# Patient Record
Sex: MALE | Race: WHITE | URBAN - METROPOLITAN AREA
[De-identification: names, ages, dates, MRNs, and addresses within clinical notes are randomized per-mention and may not be internally consistent; named-entity substitution may affect disease eponyms.]

---

## 2017-11-24 ENCOUNTER — APPOINTMENT (OUTPATIENT)
Dept: CARDIOLOGY | Facility: CLINIC | Age: 67
End: 2017-11-24
Attending: HOSPITALIST
Payer: COMMERCIAL

## 2017-11-24 ENCOUNTER — APPOINTMENT (OUTPATIENT)
Dept: GENERAL RADIOLOGY | Facility: CLINIC | Age: 67
End: 2017-11-24
Attending: EMERGENCY MEDICINE
Payer: COMMERCIAL

## 2017-11-24 ENCOUNTER — APPOINTMENT (OUTPATIENT)
Dept: CT IMAGING | Facility: CLINIC | Age: 67
End: 2017-11-24
Attending: EMERGENCY MEDICINE
Payer: COMMERCIAL

## 2017-11-24 ENCOUNTER — HOSPITAL ENCOUNTER (OUTPATIENT)
Facility: CLINIC | Age: 67
Setting detail: OBSERVATION
Discharge: HOME | End: 2017-11-25
Attending: EMERGENCY MEDICINE | Admitting: HOSPITALIST
Payer: COMMERCIAL

## 2017-11-24 DIAGNOSIS — R55 NEAR SYNCOPE: ICD-10-CM

## 2017-11-24 DIAGNOSIS — R07.9 CHEST PAIN, UNSPECIFIED TYPE: ICD-10-CM

## 2017-11-24 LAB
ANION GAP SERPL CALCULATED.3IONS-SCNC: 8 MMOL/L (ref 3–14)
ANION GAP SERPL CALCULATED.3IONS-SCNC: NORMAL MMOL/L (ref 6–17)
BASOPHILS # BLD AUTO: 0 10E9/L (ref 0–0.2)
BASOPHILS NFR BLD AUTO: 0.3 %
BUN SERPL-MCNC: 24 MG/DL (ref 7–30)
BUN SERPL-MCNC: NORMAL MG/DL (ref 7–30)
CALCIUM SERPL-MCNC: 8.4 MG/DL (ref 8.5–10.1)
CALCIUM SERPL-MCNC: NORMAL MG/DL (ref 8.5–10.1)
CHLORIDE SERPL-SCNC: 111 MMOL/L (ref 94–109)
CHLORIDE SERPL-SCNC: NORMAL MMOL/L (ref 94–109)
CO2 SERPL-SCNC: 24 MMOL/L (ref 20–32)
CO2 SERPL-SCNC: NORMAL MMOL/L (ref 20–32)
CREAT SERPL-MCNC: 1.34 MG/DL (ref 0.66–1.25)
CREAT SERPL-MCNC: NORMAL MG/DL (ref 0.66–1.25)
DIFFERENTIAL METHOD BLD: NORMAL
EOSINOPHIL # BLD AUTO: 0.2 10E9/L (ref 0–0.7)
EOSINOPHIL NFR BLD AUTO: 2.3 %
ERYTHROCYTE [DISTWIDTH] IN BLOOD BY AUTOMATED COUNT: 14 % (ref 10–15)
GFR SERPL CREATININE-BSD FRML MDRD: 53 ML/MIN/1.7M2
GFR SERPL CREATININE-BSD FRML MDRD: NORMAL ML/MIN/1.7M2
GLUCOSE SERPL-MCNC: 128 MG/DL (ref 70–99)
GLUCOSE SERPL-MCNC: NORMAL MG/DL (ref 70–99)
HCT VFR BLD AUTO: 49.6 % (ref 40–53)
HGB BLD-MCNC: 16.7 G/DL (ref 13.3–17.7)
IMM GRANULOCYTES # BLD: 0 10E9/L (ref 0–0.4)
IMM GRANULOCYTES NFR BLD: 0.3 %
LYMPHOCYTES # BLD AUTO: 3.5 10E9/L (ref 0.8–5.3)
LYMPHOCYTES NFR BLD AUTO: 37.3 %
MCH RBC QN AUTO: 29.5 PG (ref 26.5–33)
MCHC RBC AUTO-ENTMCNC: 33.7 G/DL (ref 31.5–36.5)
MCV RBC AUTO: 88 FL (ref 78–100)
MONOCYTES # BLD AUTO: 0.8 10E9/L (ref 0–1.3)
MONOCYTES NFR BLD AUTO: 8.1 %
NEUTROPHILS # BLD AUTO: 4.9 10E9/L (ref 1.6–8.3)
NEUTROPHILS NFR BLD AUTO: 51.7 %
NRBC # BLD AUTO: 0 10*3/UL
NRBC BLD AUTO-RTO: 0 /100
PLATELET # BLD AUTO: 208 10E9/L (ref 150–450)
POTASSIUM SERPL-SCNC: 4.3 MMOL/L (ref 3.4–5.3)
POTASSIUM SERPL-SCNC: NORMAL MMOL/L (ref 3.4–5.3)
RBC # BLD AUTO: 5.67 10E12/L (ref 4.4–5.9)
SODIUM SERPL-SCNC: 143 MMOL/L (ref 133–144)
SODIUM SERPL-SCNC: NORMAL MMOL/L (ref 133–144)
TROPONIN I BLD-MCNC: 0.01 UG/L (ref 0–0.1)
TROPONIN I SERPL-MCNC: <0.015 UG/L (ref 0–0.04)
TROPONIN I SERPL-MCNC: NORMAL UG/L (ref 0–0.04)
WBC # BLD AUTO: 9.4 10E9/L (ref 4–11)

## 2017-11-24 PROCEDURE — 25000132 ZZH RX MED GY IP 250 OP 250 PS 637: Mod: GY | Performed by: HOSPITALIST

## 2017-11-24 PROCEDURE — 93306 TTE W/DOPPLER COMPLETE: CPT | Mod: 26 | Performed by: INTERNAL MEDICINE

## 2017-11-24 PROCEDURE — 99285 EMERGENCY DEPT VISIT HI MDM: CPT | Mod: 25

## 2017-11-24 PROCEDURE — 25000128 H RX IP 250 OP 636: Performed by: EMERGENCY MEDICINE

## 2017-11-24 PROCEDURE — 94660 CPAP INITIATION&MGMT: CPT

## 2017-11-24 PROCEDURE — 84484 ASSAY OF TROPONIN QUANT: CPT | Mod: 91 | Performed by: EMERGENCY MEDICINE

## 2017-11-24 PROCEDURE — 40000275 ZZH STATISTIC RCP TIME EA 10 MIN

## 2017-11-24 PROCEDURE — 25000132 ZZH RX MED GY IP 250 OP 250 PS 637: Performed by: EMERGENCY MEDICINE

## 2017-11-24 PROCEDURE — 25000128 H RX IP 250 OP 636: Performed by: HOSPITALIST

## 2017-11-24 PROCEDURE — 96374 THER/PROPH/DIAG INJ IV PUSH: CPT

## 2017-11-24 PROCEDURE — 84484 ASSAY OF TROPONIN QUANT: CPT

## 2017-11-24 PROCEDURE — 84484 ASSAY OF TROPONIN QUANT: CPT | Performed by: HOSPITALIST

## 2017-11-24 PROCEDURE — 96375 TX/PRO/DX INJ NEW DRUG ADDON: CPT

## 2017-11-24 PROCEDURE — 85025 COMPLETE CBC W/AUTO DIFF WBC: CPT | Performed by: EMERGENCY MEDICINE

## 2017-11-24 PROCEDURE — 36415 COLL VENOUS BLD VENIPUNCTURE: CPT | Performed by: HOSPITALIST

## 2017-11-24 PROCEDURE — 93005 ELECTROCARDIOGRAM TRACING: CPT

## 2017-11-24 PROCEDURE — G0378 HOSPITAL OBSERVATION PER HR: HCPCS

## 2017-11-24 PROCEDURE — A9270 NON-COVERED ITEM OR SERVICE: HCPCS | Mod: GY | Performed by: HOSPITALIST

## 2017-11-24 PROCEDURE — 96361 HYDRATE IV INFUSION ADD-ON: CPT

## 2017-11-24 PROCEDURE — 71020 XR CHEST 2 VW: CPT

## 2017-11-24 PROCEDURE — 80048 BASIC METABOLIC PNL TOTAL CA: CPT | Performed by: EMERGENCY MEDICINE

## 2017-11-24 PROCEDURE — 70450 CT HEAD/BRAIN W/O DYE: CPT

## 2017-11-24 PROCEDURE — 99220 ZZC INITIAL OBSERVATION CARE,LEVL III: CPT | Performed by: HOSPITALIST

## 2017-11-24 PROCEDURE — 93306 TTE W/DOPPLER COMPLETE: CPT

## 2017-11-24 RX ORDER — ONDANSETRON 2 MG/ML
4 INJECTION INTRAMUSCULAR; INTRAVENOUS ONCE
Status: COMPLETED | OUTPATIENT
Start: 2017-11-24 | End: 2017-11-24

## 2017-11-24 RX ORDER — ACETAMINOPHEN 325 MG/1
650 TABLET ORAL EVERY 4 HOURS PRN
Status: DISCONTINUED | OUTPATIENT
Start: 2017-11-24 | End: 2017-11-25 | Stop reason: HOSPADM

## 2017-11-24 RX ORDER — LIDOCAINE 40 MG/G
CREAM TOPICAL
Status: DISCONTINUED | OUTPATIENT
Start: 2017-11-24 | End: 2017-11-25 | Stop reason: HOSPADM

## 2017-11-24 RX ORDER — NITROGLYCERIN 0.4 MG/1
TABLET SUBLINGUAL
Status: DISCONTINUED
Start: 2017-11-24 | End: 2017-11-24 | Stop reason: HOSPADM

## 2017-11-24 RX ORDER — ONDANSETRON 2 MG/ML
INJECTION INTRAMUSCULAR; INTRAVENOUS
Status: DISCONTINUED
Start: 2017-11-24 | End: 2017-11-24 | Stop reason: HOSPADM

## 2017-11-24 RX ORDER — NITROGLYCERIN 0.4 MG/1
0.4 TABLET SUBLINGUAL EVERY 5 MIN PRN
Status: DISCONTINUED | OUTPATIENT
Start: 2017-11-24 | End: 2017-11-25 | Stop reason: HOSPADM

## 2017-11-24 RX ORDER — ASPIRIN 81 MG/1
81 TABLET ORAL DAILY
Status: DISCONTINUED | OUTPATIENT
Start: 2017-11-25 | End: 2017-11-25 | Stop reason: HOSPADM

## 2017-11-24 RX ORDER — ACETAMINOPHEN 650 MG/1
650 SUPPOSITORY RECTAL EVERY 4 HOURS PRN
Status: DISCONTINUED | OUTPATIENT
Start: 2017-11-24 | End: 2017-11-25 | Stop reason: HOSPADM

## 2017-11-24 RX ORDER — SODIUM CHLORIDE 9 MG/ML
INJECTION, SOLUTION INTRAVENOUS CONTINUOUS
Status: DISCONTINUED | OUTPATIENT
Start: 2017-11-24 | End: 2017-11-25 | Stop reason: HOSPADM

## 2017-11-24 RX ORDER — NALOXONE HYDROCHLORIDE 0.4 MG/ML
.1-.4 INJECTION, SOLUTION INTRAMUSCULAR; INTRAVENOUS; SUBCUTANEOUS
Status: DISCONTINUED | OUTPATIENT
Start: 2017-11-24 | End: 2017-11-25 | Stop reason: HOSPADM

## 2017-11-24 RX ORDER — ONDANSETRON 2 MG/ML
4 INJECTION INTRAMUSCULAR; INTRAVENOUS ONCE
Status: DISCONTINUED | OUTPATIENT
Start: 2017-11-24 | End: 2017-11-24

## 2017-11-24 RX ORDER — NITROGLYCERIN 0.4 MG/1
0.4 TABLET SUBLINGUAL EVERY 5 MIN PRN
Status: DISCONTINUED | OUTPATIENT
Start: 2017-11-24 | End: 2017-11-24

## 2017-11-24 RX ORDER — ALUMINA, MAGNESIA, AND SIMETHICONE 2400; 2400; 240 MG/30ML; MG/30ML; MG/30ML
30 SUSPENSION ORAL EVERY 4 HOURS PRN
Status: DISCONTINUED | OUTPATIENT
Start: 2017-11-24 | End: 2017-11-25 | Stop reason: HOSPADM

## 2017-11-24 RX ORDER — ASPIRIN 81 MG/1
162 TABLET, CHEWABLE ORAL ONCE
Status: COMPLETED | OUTPATIENT
Start: 2017-11-24 | End: 2017-11-24

## 2017-11-24 RX ADMIN — ONDANSETRON 4 MG: 2 SOLUTION INTRAMUSCULAR; INTRAVENOUS at 11:59

## 2017-11-24 RX ADMIN — ASPIRIN 81 MG 162 MG: 81 TABLET ORAL at 18:35

## 2017-11-24 RX ADMIN — PROCHLORPERAZINE EDISYLATE 5 MG: 5 INJECTION INTRAMUSCULAR; INTRAVENOUS at 12:58

## 2017-11-24 RX ADMIN — SODIUM CHLORIDE: 9 INJECTION, SOLUTION INTRAVENOUS at 18:35

## 2017-11-24 RX ADMIN — SODIUM CHLORIDE 1000 ML: 9 INJECTION, SOLUTION INTRAVENOUS at 11:59

## 2017-11-24 RX ADMIN — ACETAMINOPHEN 650 MG: 325 TABLET, FILM COATED ORAL at 18:35

## 2017-11-24 RX ADMIN — NITROGLYCERIN 0.4 MG: 0.4 TABLET SUBLINGUAL at 12:23

## 2017-11-24 ASSESSMENT — ENCOUNTER SYMPTOMS
DIAPHORESIS: 1
DIZZINESS: 1
HEADACHES: 1
LIGHT-HEADEDNESS: 1
SHORTNESS OF BREATH: 0
BACK PAIN: 0
DIARRHEA: 0
ABDOMINAL PAIN: 0

## 2017-11-24 NOTE — PROGRESS NOTES
Paged Dr Valles to clarify heart test orders.   Please refer to his admission note regarding treatment plans.

## 2017-11-24 NOTE — ED PROVIDER NOTES
History     Chief Complaint:  Near syncope      HPI   Williasm Ma is a 67 year old male who presents to the emergency department today for evaluation of near syncope. 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 He had a near syncopal episode with no loss of consciousness, followed by emesis. He denies chest pain, shortness of breath, abdominal pain, diarrhea, or back pain. Due to concern for symptoms he presents to the emergency department for evaluation. On presentation, he complains of some headache. He denies use of beta blockers or blood thinners.  Per the patient, he was previously on tamsulosin but is no longer on it. He denies history of diabetes or cardiac pathology.    When EMS arrived, he had a pulse of around 40, but this quickly improved en-route.  In the ED, he complains of chest tightness and mild headache.  He feels hot.    Allergies:  Penicillins  Tetracyclines    Medications:    The patient is currently on no regular medications.    Past Medical History:    History reviewed. No pertinent past medical history.    Past Surgical History:    Prostate and bladder surgery 01/17  Hernia repair  Orthopedic surgery    Family History:    History reviewed. No pertinent family history.     Social History:  The patient was unaccompanied to the ED.  Smoking Status: Former  Alcohol Use: No     Review of Systems   Constitutional: Positive for diaphoresis.   HENT: Positive for tinnitus.    Eyes: Positive for visual disturbance.   Respiratory: Negative for shortness of breath.    Cardiovascular: Negative for chest pain.   Gastrointestinal: Negative for abdominal pain and diarrhea.   Musculoskeletal: Negative for back pain.   Neurological: Positive for dizziness, light-headedness and headaches. Negative for syncope.   All other systems  "reviewed and are negative.    Physical Exam     Patient Vitals for the past 24 hrs:   BP Temp Temp src Pulse Heart Rate Resp SpO2 Height Weight   11/24/17 1330 120/88 - - - 56 8 94 % - -   11/24/17 1300 109/58 - - - 59 11 - - -   11/24/17 1245 118/83 - - - 55 (!) 7 95 % - -   11/24/17 1230 (!) 121/94 - - - 58 19 95 % - -   11/24/17 1215 133/86 - - - 58 10 97 % - -   11/24/17 1200 127/90 - - - 50 8 97 % - -   11/24/17 1155 - - - - 55 12 94 % - -   11/24/17 1154 (!) 156/106 97.6  F (36.4  C) Oral 60 - 16 (!) 89 % 1.778 m (5' 10\") 129.3 kg (285 lb)   11/24/17 1150 - - - - 49 (!) 7 96 % - -   11/24/17 1148 138/87 - - - 66 - 96 % - -   11/24/17 1145 - - - - 54 - 90 % - -       Physical Exam   General: Resting comfortably on the gurney, appears mildly ill, diaphoretic. Heart rate 60 on   arrival.  Head:  The scalp, face, and head appear normal  Eyes:  The pupils are equal, round, and reactive to light    There is no nystagmus    Extraocular muscles are intact    Conjunctivae and sclerae are normal  ENT:    The nose is normal    Pinnae are normal    The oropharynx is normal    Uvula is in the midline  Neck:  Normal range of motion    There is no rigidity noted    There is no midline cervical spine pain/tenderness    Trachea is in the midline    No mass is detected  CV:  Regular rate and underlying rhythm, in the 60's    Normal S1/S2, no S3/S4    No pathological murmur detected  Resp:  Lungs are clear    There is no tachypnea    Non-labored    No rales    No wheezing   GI:  Abdomen is soft, there is no rigidity    There is a umbilical hernia scar from surgical repair.    No distension    No tympani    No rebound tenderness     Non-surgical without peritoneal features  MS:  Normal muscular tone    Symmetric motor strength    No major joint effusions    No asymmetric leg swelling, no calf tenderness  Skin:  No rash or acute skin lesions noted  Neuro: Speech is normal and fluent  Psych:  Awake. Alert.      Normal affect.  " Appropriate interactions.  Lymph: No anterior cervical lymphadenopathy noted    Emergency Department Course     ECG:  Indication: near syncope  Completed at 1140.  Read at 1143.   Normal sinus rhythm  Left axis deviation  Right bundle branch block - old per patient  Abnormal ECG   Rate 61 bpm. MO interval 172. QRS duration 144. QT/QTc 438/440. P-R-T axes 61 -48 -11.    Completed at 1221.  Read at 1223.   Sinus bradycardia  Left axis deviation  Right bundle branch block  Abnormal ECG   Rate 51 bpm. MO interval 176. QRS duration 148. QT/QTc 472/435. P-R-T axes 63 -44 -22.    Imaging:  Radiology findings were communicated with the patient who voiced understanding of the findings.  XR Chest 2 Views  IMPRESSION: No evidence of pneumonia.  Report per radiology     Head CT w/o contrast  IMPRESSION: Diffuse cerebral volume loss and cerebral white matter changes consistent with chronic small vessel ischemic disease. No evidence of acute intracranial pathology.  Report per radiology     Laboratory:  Laboratory findings were communicated with the patient who voiced understanding of the findings.  Troponin (Collected 1235): 0.01  BMP: Chloride 111 (H), Glucose 128 (H), Creatinine 1.34 (H), GFR Estimate 53 (L), Calcium 8.4 (L), o/w WNL.  Troponin (Collected 1225): <0.015  CBC: AWNL. (WBC 9.4, HGB 16.7, )    Interventions:  1159 NS Bolus 1,000mL IV  1159 Zofran 4mg IV  1223 Nitrostat 0.4 mg Sublingual  1258 Compazine 5 mg IV    Emergency Department Course:  Nursing notes and vitals reviewed.  The patient was sent for a XR Chest 2 Views and Head CT w/o contrast while in the emergency department, results above.   IV was inserted and blood was drawn for laboratory testing, results above.    1147: I performed an exam of the patient as documented above.     1215: Nursing reports patient complains of new chest tightness and heaviness, therefore plan for nitro and repeat ECG.    1248: Patient rechecked and updated. Headache is  3/10 in severity. On trial of nitroglycerin, chest tightness did not resolve. Patient had repeated emesis at 1253.    1337: Patient rechecked and updated.     1338: I spoke with Dr. Valles of the hospitalist service regarding patient's presentation, findings, and plan of care.    Findings and plan explained to the Patient who consents to admission. Discussed the patient with Dr. Valles, who will admit the patient to a obs bed for further monitoring, evaluation, and treatment.  I personally reviewed the laboratory and imaging results with the Patient and answered all related questions prior to admission.    Impression & Plan         Medical Decision Making:  This patient presents with a near syncopal spell. He was noted to be bradycardic in the field but this resolved in short order. The bradycardia was noted right after a large amount of emesis. The patient developed a low-grade right-sided headache after being in the emergency department. He also developed low-grade chest heaviness and tightness. The chest discomfort did not improve with nitroglycerin.  The patient underwent CT scan of the brain which was negative for bleed. He had a chest x-ray performed given hypoxia when falling asleep and this was nonacute. PE is in the differential diagnosis although less likely. He does have obesity as a risk factor. The patient's oxygen saturations are low to mid 90s well he is awake, when he falls asleep they can fall to 80%. This sleep induced hypoxia may require further evaluation. He will be placed on observation at this time for formal rule out for myocardial infarction.    Critical Care time:  was 35 minutes for this patient excluding procedures.    Diagnosis:    ICD-10-CM    1. Near syncope R55    2. Chest pain, unspecified type R07.9    3.  Headache  4.  Bradycardia, resolved  5.  Possible Vasovagal Near Syncope    Disposition:  Admitted to OBS for monitoring and rule out (cardiac ischemia).    Scribe  Disclosure:  I, Racheletereso Jang, am serving as a scribe at 11:54 AM on 11/24/2017 to document services personally performed by Gonzalez Moody MD based on my observations and the provider's statements to me.    11/24/2017    EMERGENCY DEPARTMENT       Gonzalez Moody MD  11/24/17 8340

## 2017-11-24 NOTE — ED NOTES
Bed: ST02  Expected date:   Expected time:   Means of arrival:   Comments:  Jess - Ashli bradycardic eta 1133

## 2017-11-24 NOTE — PHARMACY-ADMISSION MEDICATION HISTORY
Admission Medication History    Admission medication history interview status for the 11/24/2017 admission is complete. See EPIC admission navigator for prior to admission medications     Medication history source reliability:Good    OTC supplements but no prescribed medications      Time spent in this activity: 10 minutes    Prior to Admission medications    Medication Sig Last Dose Taking? Auth Provider   VITAMIN D, CHOLECALCIFEROL, PO Take 5,000 Units by mouth daily Past Week at Unknown time Yes Unknown, Entered By History   Omega-3 Fatty Acids (FISH OIL PO) Take 1 capsule by mouth daily Past Week at Unknown time Yes Unknown, Entered By History       Milton Cerrato, PharmD

## 2017-11-24 NOTE — PROGRESS NOTES
The following criteria to be met before discharge:    - Serial troponins and stress test complete - not met  - Seen and cleared by consultant if applicable - not met  - Adequate pain control on oral analgesia - met  - Vital signs normal or at patient baseline - met    November 24, 2017

## 2017-11-24 NOTE — IP AVS SNAPSHOT
[facility] Observation Unit    [address] [place] [id number]    Phone:  [phone]                                       After Visit Summary   11/24/2017    Williams Ma    MRN: [id number]           After Visit Summary Signature Page     I have received my discharge instructions, and my questions have been answered. I have discussed any challenges I see with this plan with the nurse or doctor.    ..........................................................................................................................................  Patient/Patient Representative Signature      ..........................................................................................................................................  Patient Representative Print Name and Relationship to Patient    ..................................................               ................................................  Date                                            Time    ..........................................................................................................................................  Reviewed by Signature/Title    ...................................................              ..............................................  Date                                                            Time

## 2017-11-24 NOTE — H&P
PRIMARY CARE PHYSICIAN:  None listed.      CHIEF COMPLAINT:  Near-syncope.      HISTORY OF PRESENT ILLNESS:  Mr. Williams Ma is a pleasant 67-year-old male with no significant past medical history who was brought to the ER today by EMS for presyncope.  He was the [place] at [facility] with a child in his lap when he all of a sudden felt lightheaded, diaphoretic, some blurry vision and started throwing up.  EMS was called and noted his heart rate to be in the 40s.  He reports feeling very tired, fatigued.  He does report some chest tightness and headache.  Denies any fever, chills or rigors.  Denies pain in abdomen.  Reports normal bowel and bladder habit.  Here, in the ER, he was seen by Dr. Moody.  His heart rate was noted to be in the 60s, but he was noted to desaturate when he would fall asleep to oxygen saturation in the 80s.  Hospitalist requested admission for further evaluation.      REVIEW OF SYSTEMS:  A 10-point review of systems was done and were negative apart from those mentioned in the history of present illness.  Of note, sitting him up from a lying position would worsen his lightheadedness.      PAST MEDICAL HISTORY:  None.      MEDICATIONS PRIOR TO ADMISSION:   1.  Vitamin D.   2.  Fish oil.      ALLERGIES:  Penicillin and tetracycline.      SOCIAL HISTORY:  He denies smoking, takes occasional alcohol, no illicit drug use.      FAMILY HISTORY:  Reviewed and not pertinent to current presentation.      PHYSICAL EXAMINATION:   GENERAL:  The patient is conscious, alert, oriented x3, looks very fatigued but in no apparent distress.   VITAL SIGNS:  Temperature 97.6, heart rate of 60, blood pressure 136/98 and was 109/58 On presentation.  Saturation 96% on room air.  Does desaturate to mid 80s when he falls asleep.   HEENT:  Pupils are equal and reactive to light and accommodation.  Extraocular movements are intact.  Oral mucosa is moist.   NECK:  Supple, no rigidity.   RESPIRATORY:  Lung sounds  bilaterally clear to auscultation, no wheezes or crepitation.   CARDIOVASCULAR:  Normal S1-S2, regular rate and rhythm, no murmur.   ABDOMEN:  Obese, soft, nontender, no guarding, rigidity or rebound tenderness.   LOWER EXTREMITIES:  With no edema.   NEUROLOGIC:  No focal neurological deficits noted.  Cranial nerves II-XII grossly intact.   PSYCHIATRIC:  Normal mood and affect.   MUSCULOSKELETAL:  Power 5/5 in all extremities.      LABORATORY DATA AND IMAGING:  Reviewed in Epic, mostly unremarkable except for creatinine of 1.34.  Troponin I is negative.  Glucose is 128, hemoglobin 16.7.  Chest x-ray reviewed by me shows no acute infiltrates, effusion or pneumothorax.  CT head reviewed by me shows no evidence for acute intracranial pathology.  EKG reviewed by me showed some ST depression in lead 1 and aVL and also RBBB, which the patient states is old.  I have no prior EKGs to compare with.      ASSESSMENT AND PLAN:  Mr. Williams Ma is a 67-year-old relatively healthy male who was brought to the ER today by EMS for presyncope.     1.  Presyncope, likely vasovagal.  We will admit him for observation and monitor him on telemetry.  Will check orthostatic vitals, start him on IV hydration.  Will get an echocardiogram as well.     2.  Chest tightness.  No prior history of coronary artery disease.  EKG shows some ST depression in lead 1 and aVL, but no prior EKGs to compare with.  He denies any chest pain at this time.  We will monitor him on telemetry.  Will follow serial troponins.  Will order for a dobutamine stress echocardiogram in the a.m., but being the weekend we might not be able to obtain a stress test.  If he rules out, we can probably schedule a stress test as an outpatient.     3.  Renal dysfunction, likely acute or acute on chronic.  No prior labs to compare with.  He was vasovagal and hypotensive upon presentation.  Will repeat BMP with IV hydration.     4.  Deep vein thrombosis prophylaxis.  Encourage  ambulation.  Anticipate a short stay.     5.  Likely obstructive sleep apnea.  He was noted to desaturate to mid 80s when he falls back to sleep, probably has sleep apnea given his obesity.  He should get a sleep study as an outpatient.  We can try CPAP while he is in the hospital.      CODE STATUS:  Full code.         MARY ORNELAS MD             D: 2017 14:42   T: 2017 15:23   MT: SK      Name:     AYLA JUSTICE   MRN:      [id number]        Account:      [id number]   :      1950           Admitted:     [id number]      Document: [id number]

## 2017-11-24 NOTE — IP AVS SNAPSHOT
MRN:[id number]                      After Visit Summary   11/24/2017    Williams Ma    MRN: [id number]           Thank you!     Thank you for choosing [facility] for your care. Our goal is always to provide you with excellent care. Hearing back from our patients is one way we can continue to improve our services. Please take a few minutes to complete the written survey that you may receive in the mail after you visit with us. Thank you!        Patient Information     Date Of Birth          1950        About your hospital stay     You were admitted on:  November 24, 2017 You last received care in the:  [facility] Observation Unit    You were discharged on:  November 25, 2017        Reason for your hospital stay       Observation hospitalization for evaluation of chest pain                  Who to Call     For medical emergencies, please call 911.  For non-urgent questions about your medical care, please call your primary care provider or clinic, None          Attending Provider     Provider Gonzalez Bernard MD Emergency Medicine    Yung Valles MD Internal Medicine       Primary Care Provider    Physician No Ref-Primary      After Care Instructions     Activity       Your activity upon discharge: activity as tolerated            Diet       Follow this diet upon discharge: Regular                  Follow-up Appointments     Follow-up and recommended labs and tests        A referral has been made for you to have a Lexiscan stress test next week.  Follow up with primary care provider after you return to [place].                  Future tests that were ordered for you     NM Lexiscan stress test                 Pending Results     Date and Time Order Name Status Description    11/24/2017 1151 EKG 12 lead Preliminary             Statement of Approval     Ordered          11/25/17 1014  I have reviewed and agree with all the recommendations and orders detailed in this document.   "EFFECTIVE NOW     Approved and electronically signed by:  Thien Regalado PA             Admission Information     Date & Time Provider Department Dept. Phone    2017 Yung Valles MD [facility] Observation Unit [phone]      Your Vitals Were     Blood Pressure Pulse Temperature Respirations Height Weight    127/80 (BP Location: Right arm) 60 97.4  F (36.3  C) (Oral) 16 1.778 m (5' 10\") 130.3 kg (287 lb 4.8 oz)    Pulse Oximetry BMI (Body Mass Index)                92% 41.22 kg/m2          MyChart Information     [employer] lets you send messages to your doctor, view your test results, renew your prescriptions, schedule appointments and more. To sign up, go to [email or web address] . Click on \"Log in\" on the left side of the screen, which will take you to the Welcome page. Then click on \"Sign up Now\" on the right side of the page.     You will be asked to enter the access code listed below, as well as some personal information. Please follow the directions to create your username and password.     Your access code is: [email or web address]  Expires: 2018  5:28 PM     Your access code will  in 90 days. If you need help or a new code, please call your [place] clinic or [phone].        Care EveryWhere ID     This is your Care EveryWhere ID. This could be used by other organizations to access your [place] medical records  [id number]        Equal Access to Services     [facility] AH: Hadii derik ku hadasho Soomaali, waaxda luqadaha, qaybta kaalmada adeegyada, jose luis blake. So [facility] [phone].    ATENCIÓN: Si habla español, tiene a lopez disposición servicios gratuitos de asistencia lingüística. Llame al [phone].    We comply with applicable federal civil rights laws and [place] laws. We do not discriminate on the basis of race, color, national origin, age, disability, sex, sexual orientation, or gender identity.               Review of your medicines    "   START taking        Dose / Directions    aspirin 81 MG EC tablet   Used for:  Chest pain, unspecified type        Dose:  81 mg   Start taking on:  11/26/2017   Take 1 tablet (81 mg) by mouth daily   Refills:  0         CONTINUE these medicines which have NOT CHANGED        Dose / Directions    FISH OIL PO        Dose:  1 capsule   Take 1 capsule by mouth daily   Refills:  0       VITAMIN D (CHOLECALCIFEROL) PO        Dose:  5000 Units   Take 5,000 Units by mouth daily   Refills:  0            Where to get your medicines      Some of these will need a paper prescription and others can be bought over the counter. Ask your nurse if you have questions.     You don't need a prescription for these medications     aspirin 81 MG EC tablet                Protect others around you: Learn how to safely use, store and throw away your medicines at www.disposemymeds.org.             Medication List: This is a list of all your medications and when to take them. Check marks below indicate your daily home schedule. Keep this list as a reference.      Medications           Morning Afternoon Evening Bedtime As Needed    aspirin 81 MG EC tablet   Take 1 tablet (81 mg) by mouth daily   Start taking on:  11/26/2017                                FISH OIL PO   Take 1 capsule by mouth daily                                VITAMIN D (CHOLECALCIFEROL) PO   Take 5,000 Units by mouth daily

## 2017-11-24 NOTE — ED NOTES
[facility]  ED Nurse Handoff Report    ED Chief complaint: Loss of Consciousness      ED Diagnosis:   Final diagnoses:   None       Code Status: Full Code    Allergies:   Allergies   Allergen Reactions     Penicillins      Tetracyclines        Activity level - Baseline/Home:  Independent    Activity Level - Current:   Stand with Assist of 2     Needed?: No    Isolation: No  Infection: Not Applicable    Bariatric?: No    Vital Signs:   Vitals:    11/24/17 1230 11/24/17 1245 11/24/17 1300 11/24/17 1330   BP: (!) 121/94 118/83 109/58 120/88   Pulse:       Resp: 19 (!) 7 11 8   Temp:       TempSrc:       SpO2: 95% 95%  94%   Weight:       Height:           Cardiac Rhythm: ,        Pain level: 0-10 Pain Scale: 0    Is this patient confused?: No    Patient Report: Initial Complaint: near syncope  Focused Assessment: Williams Ma is a 67 year old male who presents to the emergency department today for evaluation of near syncope. 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 He had a near syncopal episode with no loss of consciousness, followed by emesis. He denies chest pain, shortness of breath, abdominal pain, diarrhea, or back pain. Due to concern for symptoms he presents to the emergency department for evaluation. On presentation, he complains of some headache. He denies use of beta blockers or blood thinners. Pt did complain later of CP and we tried nitro without relief and repeat EKG. PT O2 SATS low 90s on arrival but when pt would fall asleep he would desat into the 70s. Pt put on 6 lpm o2 but continued to desat into the 70s when sleeping.   Tests Performed: labs, CT,XR  Abnormal Results: SEE RESULTS  Treatments provided: fluids.    Family Comments: no family here pt lives in [place]    OBS brochure/video  discussed/provided to patient: yes    ED Medications:   Medications   ondansetron (ZOFRAN) injection 4 mg (not administered)   nitroGLYcerin (NITROSTAT) sublingual tablet 0.4 mg (0.4 mg Sublingual Given 11/24/17 1223)   nitroGLYcerin (NITROSTAT) 0.4 MG sublingual tablet (not administered)   0.9% sodium chloride BOLUS (1,000 mLs Intravenous New Bag 11/24/17 1159)   ondansetron (ZOFRAN) injection 4 mg (4 mg Intravenous Given 11/24/17 1159)   prochlorperazine (COMPAZINE) injection 5 mg (5 mg Intravenous Given 11/24/17 1258)       Drips infusing?:  No      ED NURSE PHONE NUMBER: [phone]

## 2017-11-25 VITALS
OXYGEN SATURATION: 92 % | TEMPERATURE: 97.4 F | DIASTOLIC BLOOD PRESSURE: 80 MMHG | WEIGHT: 287.3 LBS | BODY MASS INDEX: 41.13 KG/M2 | HEIGHT: 70 IN | HEART RATE: 60 BPM | SYSTOLIC BLOOD PRESSURE: 127 MMHG | RESPIRATION RATE: 16 BRPM

## 2017-11-25 LAB
ANION GAP SERPL CALCULATED.3IONS-SCNC: 7 MMOL/L (ref 3–14)
BUN SERPL-MCNC: 18 MG/DL (ref 7–30)
CALCIUM SERPL-MCNC: 8.2 MG/DL (ref 8.5–10.1)
CHLORIDE SERPL-SCNC: 108 MMOL/L (ref 94–109)
CO2 SERPL-SCNC: 27 MMOL/L (ref 20–32)
CREAT SERPL-MCNC: 1.16 MG/DL (ref 0.66–1.25)
GFR SERPL CREATININE-BSD FRML MDRD: 62 ML/MIN/1.7M2
GLUCOSE SERPL-MCNC: 85 MG/DL (ref 70–99)
POTASSIUM SERPL-SCNC: 4.1 MMOL/L (ref 3.4–5.3)
SODIUM SERPL-SCNC: 142 MMOL/L (ref 133–144)

## 2017-11-25 PROCEDURE — 36415 COLL VENOUS BLD VENIPUNCTURE: CPT | Performed by: HOSPITALIST

## 2017-11-25 PROCEDURE — 25000128 H RX IP 250 OP 636: Performed by: HOSPITALIST

## 2017-11-25 PROCEDURE — 96361 HYDRATE IV INFUSION ADD-ON: CPT

## 2017-11-25 PROCEDURE — 99217 ZZC OBSERVATION CARE DISCHARGE: CPT | Performed by: PHYSICIAN ASSISTANT

## 2017-11-25 PROCEDURE — 80048 BASIC METABOLIC PNL TOTAL CA: CPT | Performed by: HOSPITALIST

## 2017-11-25 PROCEDURE — G0378 HOSPITAL OBSERVATION PER HR: HCPCS

## 2017-11-25 RX ADMIN — SODIUM CHLORIDE: 9 INJECTION, SOLUTION INTRAVENOUS at 04:57

## 2017-11-25 NOTE — PROGRESS NOTES
The following criteria to be met before discharge:     - Serial troponins - met  -stress test complete - not met  - Seen and cleared by consultant if applicable - not met  - Adequate pain control on oral analgesia - met  - Vital signs normal or at patient baseline - met

## 2017-11-25 NOTE — PLAN OF CARE
Problem: Patient Care Overview  Goal: Plan of Care/Patient Progress Review  Outcome: Improving  Patient states he feels much batter than he did upon admission.  2 of 3 trops negative.  No nausea or vomiting this shift.  Head CT and CXR negative.  Tele=SR with right BBB.  Adenosine stress test ordered and unavailable tomorrow, MD aware.  No nausea or vomiting this shift.  PT is alert and oriented X4.  Up with SBA.

## 2017-11-25 NOTE — PROGRESS NOTES
Pt was on CPAP for few hrs and refused to wear the rest of the night. RT will continue to follow.  11/25/2017  Kobe Constantino

## 2017-11-25 NOTE — PROGRESS NOTES
The following criteria to be met before discharge:    - Serial troponins and stress test complete - not met  - Seen and cleared by consultant if applicable - not met  - Adequate pain control on oral analgesia - met  - Vital signs normal or at patient baseline - met

## 2017-11-25 NOTE — PROGRESS NOTES
The following criteria to be met before discharge:    - Serial troponins and stress test complete - not met  - Seen and cleared by consultant if applicable - not met  - Adequate pain control on oral analgesia - met  - Vital signs normal or at patient baseline - met    A&O x4. SBA to BR. Denies chest pain, n/v, or dizziness. VSS. Tolerating diet though poor appetite. Declined CPAP. Pt states he is feeling much better. Trops neg. Tele SR with BBB. Stress test tomorrow.

## 2017-11-25 NOTE — PLAN OF CARE
Problem: Patient Care Overview  Goal: Plan of Care/Patient Progress Review  Outcome: Adequate for Discharge Date Met: 11/25/17  Patient discharged to home by wheelchair at 11:47 AM 11/25/17.  Medication regimen  discussed with patient and patient verbalizes understanding.  Diet and activity discussed with patient.  Upcoming appointments reviewed.  No questions at this time.

## 2017-11-25 NOTE — PROGRESS NOTES
Pt's son called from [place] worried that pt might be having slurred speech after speaking on the phone with pt. Writer assessed pt, speech is clear. Neuro check normal.

## 2017-11-25 NOTE — PLAN OF CARE
Problem: Patient Care Overview  Goal: Plan of Care/Patient Progress Review  Outcome: Improving  A&O. VSS,RA. Denied chest pain/dizziness/lightheadedness/nausea this shift. Trop 3/3 negative. IV infusing. SBA for transfer. Plan fort dobutamine stress test but due to weekend which is not going to happen today. MD aware. Continue to monitor.

## 2017-11-26 LAB — INTERPRETATION ECG - MUSE: NORMAL

## 2017-11-26 NOTE — DISCHARGE SUMMARY
[facility]    Discharge Summary  Hospitalist    Date of Admission:  11/24/2017  Date of Discharge:  11/25/2017  Discharging Provider: Thien Regalado  Date of Service (when I saw the patient): 11/25/17    Discharge Diagnoses   Presyncope, likely vasovagal  Chest tightness  Acute kidney injury with possible chronic kidney disease  Suspected sleep apnea  Obesity    History of Present Illness   Mr. Williams Ma is a pleasant 67-year-old male with no significant past medical history who was brought to the ER 11/24 by EMS for presyncope.  He was the [place] at [facility] with a child in his lap when he all of a sudden felt lightheaded, diaphoretic, some blurry vision and started vomiting.  EMS was called and noted his heart rate to be in the 40s.  He reports feeling very tired, fatigued.  He does report some chest tightness and headache.  Denies any fever, chills or rigors.  Denies pain in abdomen.  Reports normal bowel and bladder habit.  Here, in the ER, he was seen by Dr. Moody.  His heart rate was noted to be in the 60s, but he was noted to desaturate when he would fall asleep to oxygen saturation in the 80s.  He was admitted to the hospitalist service observation care unit for further evaluation.    Hospital Course   Williams Ma was admitted on 11/24/2017.  The following problems were addressed during his hospitalization:  Mr. Williams Ma is a 67-year-old relatively healthy male who was brought to the ER by EMS for presyncope.      Presyncope, likely vasovagal.    - CT head negative for any acute intracranial pathology.  - Telemetry was monitored without any concerning arrhythmias.   - He was hydrated with IV fluids.  - Orthostatic vital signs were normal after hydration.     - Echocardiogram shows normal ejection fraction of 60%.  There are no regional wall motion abnormalities.  No significant valvular disease.  Ascending aorta is moderately dilated 4.6 cm.  - Close follow-up with  primary care provider.     Chest tightness.  No prior history of coronary artery disease.  EKG shows some ST depression in lead 1 and aVL, but no prior EKGs to compare with.  Chest x-ray negative for acute abnormalities.  He denies any chest pain during this admission.  - Telemetry without concerning findings.  - Serial troponin negative x3.   - Plan for medical stress test in the morning, however due to limitations in this hospital for doing medical stress tests on the weekend this was unable to be completed  - Discussed with patient and he will follow-up next week for a Lexiscan nuclear stress test.  - He was instructed to continue with aspirin 81 mg daily until follow-up.     Acute kidney injury, with possible chronic kidney disease.  Creatinine 1.34 on admission.  No prior labs to compare with.  He was vasovagal and hypotensive upon presentation.  Repeat creatinine after hydration is 1.16.      Likely obstructive sleep apnea.  He was noted to desaturate to mid 80s when he falls back to sleep, probably has sleep apnea given his obesity.  He should get a sleep study as an outpatient.  Patient elects to do this once he gets back to [place].    Thien Regalado PA-C    Significant Results and Procedures   Imaging and test results are as detailed below.    Pending Results   None    Code Status   Full Code       Primary Care Physician   Not listed, but [place] with [place]CP in [place]    I personally saw the patient on day of discharge.  Test results were explained and plan of care was discussed.    Discharge Disposition   Discharged to home  Condition at discharge: Stable    Consultations This Hospital Stay   SOCIAL WORK IP CONSULT    Discharge Orders     NM Lexiscan stress test     Reason for your hospital stay   Observation hospitalization for evaluation of chest pain     Follow-up and recommended labs and tests    A referral has been made for you to have a Lexiscan stress test next week.  Follow up with  primary care provider after you return to [place].     Activity   Your activity upon discharge: activity as tolerated     Full Code     Diet   Follow this diet upon discharge: Regular       Discharge Medications   Discharge Medication List as of 11/25/2017 11:48 AM      START taking these medications    Details   aspirin EC 81 MG EC tablet Take 1 tablet (81 mg) by mouth daily, OTC         CONTINUE these medications which have NOT CHANGED    Details   VITAMIN D, CHOLECALCIFEROL, PO Take 5,000 Units by mouth daily, Historical      Omega-3 Fatty Acids (FISH OIL PO) Take 1 capsule by mouth daily, Historical           Allergies   Allergies   Allergen Reactions     Penicillins      Tetracyclines      Data   Most Recent 3 CBC's:  Recent Labs   Lab Test  11/24/17   1145   WBC  9.4   HGB  16.7   MCV  88   PLT  208      Most Recent 3 BMP's:  Recent Labs   Lab Test  11/25/17   0605  11/24/17   1225  11/24/17   1145   NA  142  143  Canceled, Test credited   POTASSIUM  4.1  4.3  Canceled, Test credited   CHLORIDE  108  111*  Canceled, Test credited   CO2  27  24  Canceled, Test credited   BUN  18  24  Canceled, Test credited   CR  1.16  1.34*  Canceled, Test credited   ANIONGAP  7  8  Canceled, Test credited   RANDY  8.2*  8.4*  Canceled, Test credited   GLC  85  128*  Canceled, Test credited     Most Recent 3 Troponin's:  Recent Labs   Lab Test  11/24/17   2220  11/24/17   1750  11/24/17   1235  11/24/17   1225   TROPI  <0.015  <0.015   --   <0.015   TROPONIN   --    --   0.01   --      Results for orders placed or performed during the hospital encounter of 11/24/17   XR Chest 2 Views    Narrative    XR CHEST 2 VW 11/24/2017 1:26 PM    HISTORY: Hypoxia. Question pneumonia.    COMPARISON: None.    FINDINGS: No airspace consolidation, pleural effusion or pneumothorax.  Heart size is exaggerated by supine technique. Moderate degenerative  change in the thoracic spine without compression fracture.      Impression    IMPRESSION: No  evidence of pneumonia.    CRYSTAL EMANUEL MD   Head CT w/o contrast    Narrative    CT OF THE HEAD WITHOUT CONTRAST 11/24/2017 1:18 PM     COMPARISON: None.    HISTORY: Headache, bleed.    TECHNIQUE: 5 mm thick axial CT images of the head were acquired  without IV contrast material.    FINDINGS:  There is mild diffuse cerebral volume loss. There are  subtle patchy areas of decreased density in the cerebral white matter  bilaterally that are consistent with sequela of chronic small vessel  ischemic disease.     The ventricles and basal cisterns are within normal limits in  configuration given the degree of cerebral volume loss.  There is no  midline shift. There are no extra-axial fluid collections.     No intracranial hemorrhage, mass or recent infarct.    The visualized paranasal sinuses are well-aerated. There is no  mastoiditis. There are no fractures of the visualized bones.       Impression    IMPRESSION:  Diffuse cerebral volume loss and cerebral white matter  changes consistent with chronic small vessel ischemic disease. No  evidence for acute intracranial pathology.      Radiation dose for this scan was reduced using automated exposure  control, adjustment of the mA and/or kV according to patient size, or  iterative reconstruction technique.    MALU TIJERINA MD

## 2020-03-11 ENCOUNTER — HEALTH MAINTENANCE LETTER (OUTPATIENT)
Age: 70
End: 2020-03-11

## 2020-12-27 ENCOUNTER — HEALTH MAINTENANCE LETTER (OUTPATIENT)
Age: 70
End: 2020-12-27

## 2021-04-25 ENCOUNTER — HEALTH MAINTENANCE LETTER (OUTPATIENT)
Age: 71
End: 2021-04-25

## 2021-10-09 ENCOUNTER — HEALTH MAINTENANCE LETTER (OUTPATIENT)
Age: 71
End: 2021-10-09

## 2022-05-21 ENCOUNTER — HEALTH MAINTENANCE LETTER (OUTPATIENT)
Age: 72
End: 2022-05-21

## 2022-09-17 ENCOUNTER — HEALTH MAINTENANCE LETTER (OUTPATIENT)
Age: 72
End: 2022-09-17

## 2023-06-04 ENCOUNTER — HEALTH MAINTENANCE LETTER (OUTPATIENT)
Age: 73
End: 2023-06-04